# Patient Record
Sex: FEMALE | Race: BLACK OR AFRICAN AMERICAN
[De-identification: names, ages, dates, MRNs, and addresses within clinical notes are randomized per-mention and may not be internally consistent; named-entity substitution may affect disease eponyms.]

---

## 2017-06-23 ENCOUNTER — HOSPITAL ENCOUNTER (EMERGENCY)
Dept: HOSPITAL 72 - EMR | Age: 42
Discharge: HOME | End: 2017-06-23
Payer: COMMERCIAL

## 2017-06-23 VITALS — SYSTOLIC BLOOD PRESSURE: 147 MMHG | DIASTOLIC BLOOD PRESSURE: 87 MMHG

## 2017-06-23 VITALS — BODY MASS INDEX: 37.77 KG/M2 | WEIGHT: 235 LBS | HEIGHT: 66 IN

## 2017-06-23 DIAGNOSIS — M54.31: ICD-10-CM

## 2017-06-23 DIAGNOSIS — J04.0: Primary | ICD-10-CM

## 2017-06-23 PROCEDURE — 99284 EMERGENCY DEPT VISIT MOD MDM: CPT

## 2017-06-23 NOTE — EMERGENCY ROOM REPORT
History of Present Illness


General


Chief Complaint:  Pain


Source:  Patient





Present Illness


[facility] [place]


The patient is a 42-year-old female presenting for both coarse voice and right 

leg pain.  The patient states that the right leg pain began 3 months prior for 

no known reason.  She denies any injury.  Pain is described as an 8/10 dull/

electric pain that radiates from the R lower back down the R leg.  The pain is 

worse with prolonged sitting and bending over.  She denies any numbness or 

tingling.  She denies any leg swelling. 


The coarse voice began 2 months prior for no known reason.  She denies any sick 

contacts recent travel.  She denies coughing, sore throat, chest pain, 

shortness of breath, nausea, vomiting, fever, chills


Allergies:  


Coded Allergies:  


     No Known Allergies (Unverified , 17)





Patient History


Past Medical History:  see triage record


Pertinent Family History:  none


Last Menstrual Period:  17


Pregnant Now:  No


:  4


Para:  1


Reviewed Nursing Documentation:  PMH: Agreed, PSxH: Agreed





Nursing Documentation-PM


Past Medical History:  No Stated History





Review of Systems


All Other Systems:  negative except mentioned in HPI





Physical Exam





Vital Signs








  Date Time  Temp Pulse Resp B/P Pulse Ox O2 Delivery O2 Flow Rate FiO2


 


17 11:34 98.1 67 17 142/84 100 Room Air  








Sp02 EP Interpretation:  reviewed, normal


General Appearance:  no apparent distress, alert, GCS 15, non-toxic


Head:  normocephalic, atraumatic


Eyes:  bilateral eye PERRL, bilateral eye normal inspection


ENT:  hearing grossly normal, normal pharynx, no angioedema, uvula midline, 

moist mucus membranes, other - hoarse voice


Neck:  full range of motion, supple/symm/no masses


Respiratory:  chest non-tender, lungs clear, normal breath sounds, no wheezing, 

speaking full sentences


Cardiovascular #1:  regular rate, rhythm, no edema


Gastrointestinal:  normal bowel sounds, non tender, soft, non-distended, no 

guarding, no rebound


Genitourinary:  normal inspection, no CVA tenderness


Musculoskeletal:  normal inspection, gait/station normal, normal range of motion

, tender - TTP over the R posterior buttock and hamstring


Neurologic:  alert, oriented x3, responsive, motor strength/tone normal, 

sensory intact, normal gait, speech normal


Psychiatric:  judgement/insight normal, memory normal, mood/affect normal, no 

suicidal/homicidal ideation


Skin:  normal color, no rash, warm/dry, well hydrated


Lymphatic:  no adenopathy





Medical Decision Making


PA Attestation


Dr. Emery is my supervising physician. Patient management was discussed with 

my supervising physician


Diagnostic Impression:  


 Primary Impression:  


 Laryngitis, acute


 Additional Impression:  


 Sciatica


 Qualified Codes:  M54.31 - Sciatica, right side


ER Course


The patient is a 42-year-old female presenting for both coarse voice and right 

leg pain





Differential diagnosis include but not limited to pharyngitis, laryngitis, 

sinusitis, AOM, bronchitis, PNA, DVT, sciatica, muscle strain, disc herniation, 

among others





PE: vitals WNL. NAD


HEENT unremarkable. No lymphadenopathy. 


Lungs CTA bilat. 


Back: There is no midline tenderness.  There is right-sided lumbar paraspinal 

tenderness as well as right buttock tenderness.


Normal gait


There is no calf tenderness or edema.





The patient will be discharged home with a prescription for omeprazole, Motrin, 

and gabapentin.





She will followup with primary doctor and is given ER precautions





Last Vital Signs








  Date Time  Temp Pulse Resp B/P Pulse Ox O2 Delivery O2 Flow Rate FiO2


 


17 11:34 98.1 67 17 142/84 100 Room Air  








Status:  improved


Disposition:  HOME, SELF-CARE


Condition:  Improved


Scripts


Gabapentin* (GABAPENTIN*) 300 Mg Capsule


300 MG ORAL THREE TIMES A DAY, #30 CAP 0 Refills


   Prov: ANNA MCGILL P.A.         17 


Omeprazole (OMEPRAZOLE) 20 Mg Capsule.dr


20 MG ORAL DAILY, #30 CAP


   Prov: TERZIAN,ANNA P.A.         17 


Ibuprofen* (MOTRIN*) 600 Mg Tablet


600 MG ORAL Q8H Y for For Pain, #30 TAB 0 Refills


   Prov: TERZIANANNA P.A.         17


Patient Instructions:  Sciatica, Laryngitis





Additional Instructions:  


I discussed my findings with the patient. All questions and concerns have been 

answered. Treatment and medication compliance have been addressed. I advised 

the patient that they need to follow up with PMD in 3-5 days. Return to ED if 

symptoms worsen, new symptoms arise, or if needed for any reason. Patient 

verbalized understanding of discharge instructions.











ANNA MCGILL 2017 12:41

## 2017-09-11 ENCOUNTER — HOSPITAL ENCOUNTER (EMERGENCY)
Dept: HOSPITAL 72 - EMR | Age: 42
Discharge: HOME | End: 2017-09-11
Payer: COMMERCIAL

## 2017-09-11 VITALS — SYSTOLIC BLOOD PRESSURE: 132 MMHG | DIASTOLIC BLOOD PRESSURE: 89 MMHG

## 2017-09-11 VITALS — WEIGHT: 230 LBS | HEIGHT: 66 IN | BODY MASS INDEX: 36.96 KG/M2

## 2017-09-11 VITALS — SYSTOLIC BLOOD PRESSURE: 135 MMHG | DIASTOLIC BLOOD PRESSURE: 77 MMHG

## 2017-09-11 DIAGNOSIS — G89.29: Primary | ICD-10-CM

## 2017-09-11 DIAGNOSIS — J37.0: ICD-10-CM

## 2017-09-11 DIAGNOSIS — M54.9: ICD-10-CM

## 2017-09-11 PROCEDURE — 99283 EMERGENCY DEPT VISIT LOW MDM: CPT

## 2017-09-11 NOTE — EMERGENCY ROOM REPORT
History of Present Illness


General


Chief Complaint:  Pain


Source:  Patient





Present Illness


HPI


42-year-old female history of chronic back pain presenting with 4 months of 

change in voice and 4 months of back pain.


Pt states that she has had a very raspy voice for the last 4 months, states 

that in her job she is forced to talk a lot, and by the end of each work today 

she feels that her voice is almost gone.  Patient denies any throat pain, any 

pain with swallowing, any cough, any shortness of breath, or any fever or 

chills.


Patient also stating that she has had more than one year of right-sided chronic 

back pain.  Starts in the right lower back and radiates down thigh.  Worsened 

with any movement.  Patient states that she is to take gabapentin for the pain 

which relieved the pain.  Patient states that she has been taking Motrin 

Robaxin without any relief.  Patient denies any weakness or numbness of legs, 

no urinary retention, no fever chills


Allergies:  


Coded Allergies:  


     No Known Allergies (Unverified , 6/23/17)





Patient History


Past Medical History:  see triage record


Past Surgical History:  none


Pertinent Family History:  none


Last Menstrual Period:  9/1/17


Pregnant Now:  No


Reviewed Nursing Documentation:  PMH: Agreed, PSxH: Agreed





Nursing Documentation-PMH


Past Medical History:  No Stated History





Review of Systems


All Other Systems:  negative except mentioned in HPI





Physical Exam





Vital Signs








  Date Time  Temp Pulse Resp B/P (MAP) Pulse Ox O2 Delivery O2 Flow Rate FiO2


 


9/11/17 10:52 98.2 80 21 163/100 100 Room Air  








Sp02 EP Interpretation:  reviewed, normal


General Appearance:  normal inspection, well appearing, no apparent distress, 

alert, GCS 15, non-toxic


Head:  normocephalic, atraumatic


Eyes:  bilateral eye normal inspection, bilateral eye PERRL, bilateral eye EOMI


ENT:  normal ENT inspection, normal pharynx, moist mucus membranes, other - No 

tonsillar or uvula enlargement or exudates, uvula is midline, no erythema.  

Speaking with raspy voice


Neck:  normal inspection, full range of motion, supple


Respiratory:  normal inspection, lungs clear, normal breath sounds, no 

respiratory distress, no retraction, no wheezing, speaking full sentences, 

chest symmetrical


Cardiovascular #1:  normal inspection, regular rate, rhythm, no edema, normal 

capillary refill


Cardiovascular #2:  2+ radial (R), 2+ radial (L)


Gastrointestinal:  normal inspection, non tender, soft, non-distended, no 

guarding


Musculoskeletal:  normal range of motion, other - Paraspinal right lower lumbar 

tenderness, no midline tenderness.  Bilateral calves are nontender


Neurologic:  normal inspection, alert, oriented x3, responsive, motor strength/

tone normal, sensory intact, normal gait, speech normal


Psychiatric:  normal inspection, judgement/insight normal, memory normal


Skin:  normal inspection, normal color, no rash, warm/dry, well hydrated, 

normal turgor





Medical Decision Making


Diagnostic Impression:  


 Primary Impression:  


 Chronic back pain greater than 3 months duration


 Additional Impression:  


 Chronic laryngitis


ER Course


41 yo female with pmhx of chronic back pain p/w back pain and raspy voice


DDX: likely musculoskeletal back pain vs. muscular strain vs. sciatica


Lumbar fracture is unlikely given patients age, no midline tenderness, no 

history of trauma, and that patient is ambulatory. Therefore, at this time no 

imaging is indicated 


Serious diagnoses such as cord compression, epidural abscess is unlikely in 

this patient given the clinical scenario and abscess of neurological symptoms 

or findings. Patient appears nontoxic. 


Voice change likely secondary to laryngitis





Plan: motrin, robaxin offered however patient states that it does not work.  

Patient just wants a prescription for gabapentin





ER course:


Patient has remained nontoxic appearing and ambulatory in the ED. 








Disposition:


Patient will be discharged to home with prescription of gabapentin


Strict precautions discussed with patient on when to emergently return to the 

ED which includes severe/worsening back pain, leg weakness/numbness, urinary 

retention/incontinence, fever or chills, which may indicate severe illness.


Patient is to follow up with their PMD within 5 days. Patient agrees with plan. 


Patient also instructed to followup with an ENT specialist





Please note that this Emergency Department Report was dictated using [employer] technology software, occasionally this can lead to 

erroneous entry secondary to interpretation by the dictation equipment.





Last Vital Signs








  Date Time  Temp Pulse Resp B/P (MAP) Pulse Ox O2 Delivery O2 Flow Rate FiO2


 


9/11/17 11:03 98.4  18 135/77 100 Room Air  


 


9/11/17 10:52  80      








Disposition:  HOME, SELF-CARE


Condition:  Stable


Scripts


Gabapentin (Neurontin) 300 Mg Capsule


300 MG ORAL THREE TIMES A DAY for 7 Days, #21 CAP 0 Refills


   Prov: Gayatri Elizondo M.D.         9/11/17


Referrals:  


LIDA PORTILLO,REFERRING (PCP)


Patient Instructions:  Chronic Back Pain, Laryngitis, Easy-to-Read, Sciatica, 

Easy-to-Read











Gayatri Elizondo M.D. Sep 11, 2017 11:27

## 2018-01-23 ENCOUNTER — HOSPITAL ENCOUNTER (EMERGENCY)
Dept: HOSPITAL 72 - EMR | Age: 43
Discharge: HOME | End: 2018-01-23
Payer: COMMERCIAL

## 2018-01-23 VITALS — DIASTOLIC BLOOD PRESSURE: 52 MMHG | SYSTOLIC BLOOD PRESSURE: 143 MMHG

## 2018-01-23 VITALS — WEIGHT: 245 LBS | HEIGHT: 66 IN | BODY MASS INDEX: 39.37 KG/M2

## 2018-01-23 DIAGNOSIS — M54.31: Primary | ICD-10-CM

## 2018-01-23 PROCEDURE — 96372 THER/PROPH/DIAG INJ SC/IM: CPT

## 2018-01-23 PROCEDURE — 99284 EMERGENCY DEPT VISIT MOD MDM: CPT

## 2018-01-23 NOTE — EMERGENCY ROOM REPORT
History of Present Illness


General


Chief Complaint:  Pain


Source:  Patient





Present Illness


HPI


42-year-old female presents to the emergency department complaining of 10 out 

of 10 in severity right-sided low back pain that shoots down into the posterior 

right thigh x7 days.  Patient reports history of recurrent sciatica presenting 

with similar symptoms that she has experienced in the past.  Patient denies 

fevers, chills, recent spinal procedures, history of neoplastic disease.  

Patient denies trauma or fall.  Patient denies midline neck or back pain.  

Patient denies urinary retention.  She reports having multiple courses of 

muscle relaxers, pain medications, Neurontin with minimal to no relief.  

Patient states that her symptoms have increased in frequency of recurrence.  

She states she has not followed up with PMD and has not had further evaluation 

or imaging performed. Denies numbness tingling or loss of sensation or gross 

motor movements of the extremities, incontinence of bowel or bladder. Denies CP

, Palpitations, LOC, AMS, dizziness, Changes in Vision, Sensation, paresthesias

, or a sudden severe headache.


Allergies:  


Coded Allergies:  


     No Known Allergies (Unverified , 6/23/17)





Patient History


Past Medical History:  see triage record


Past Surgical History:  none


Pertinent Family History:  none


Last Menstrual Period:  01/09/17.


Pregnant Now:  No


Immunizations:  UTD


Reviewed Nursing Documentation:  PMH: Agreed, PSxH: Agreed





Review of Systems


All Other Systems:  negative except mentioned in HPI





Physical Exam





Vital Signs








  Date Time  Temp Pulse Resp B/P (MAP) Pulse Ox O2 Delivery O2 Flow Rate FiO2


 


1/23/18 11:43 97.7 84 19 143/52 100 Room Air  








Sp02 EP Interpretation:  reviewed, normal


General Appearance:  no apparent distress, alert, GCS 15, non-toxic


Head:  normocephalic, atraumatic


ENT:  hearing grossly normal, normal voice


Neck:  full range of motion


Respiratory:  lungs clear, normal breath sounds, speaking full sentences


Cardiovascular #1:  regular rate, rhythm, no edema, normal capillary refill


Cardiovascular #2:  2+ dorsalis pedis (R), 2+ dorsalis pedis (L)


Rectal:  deferred


Musculoskeletal:  back normal, gait/station normal, normal range of motion, 

tender - Right lateral paraspinal TTP, upper right gluteal TTP with 

exacerbation of radiating symptoms.  no LE weakness, NVI


Neurologic:  alert, oriented x3, responsive, motor strength/tone normal, 

sensory intact, normal gait, speech normal, grossly normal


Psychiatric:  judgement/insight normal


Skin:  normal color, no rash, warm/dry, well hydrated





Medical Decision Making


PA Attestation


Dr. Barry is my supervising Physician whom patient management has been 

discussed with.


Diagnostic Impression:  


 Primary Impression:  


 Sciatica


 Qualified Codes:  M54.31 - Sciatica, right side


ER Course


42-year-old female presents to the emergency department complaining of 10 out 

of 10 in severity right-sided low back pain that shoots down into the posterior 

right thigh x7 days.  Patient reports history of recurrent sciatica presenting 

with similar symptoms that she has experienced in the past.  Patient denies 

fevers, chills, recent spinal procedures, history of neoplastic disease.  

Patient denies trauma or fall.  Patient denies midline neck or back pain.  

Patient denies urinary retention.  She reports having multiple courses of 

muscle relaxers, pain medications, Neurontin with minimal to no relief.  

Patient states that her symptoms have increased in frequency of recurrence.  

She states she has not followed up with PMD and has not had further evaluation 

or imaging performed. Denies numbness tingling or loss of sensation or gross 

motor movements of the extremities, incontinence of bowel or bladder. Denies CP

, Palpitations, LOC, AMS, dizziness, Changes in Vision, Sensation, paresthesias

, or a sudden severe headache. 





Ddx considered but are not limited to Fracture, dislocation, contusion, 

epidural abscess, Sprain/Strain/Spasm





Vital signs: are WNL, pt. is afebrile


H&PE are most consistent with sciatica


Pt. unable to tolerate straight leg raise. 





ORDERS: X-ray not required at this time, no spinous process tenderness





ED INTERVENTIONS: 





 IM Toradol 20mg. 


-Soma PO





D/W pt. that MRI is necessary to develop appropriate treatment of her recurrent 

symptoms. This needs to be given as a referral from her PCP. 





DISCHARGE: At this time pt. is stable for d/c to home. Will provide printed 

patient care instructions, and any necessary prescriptions. Care plan and 

follow up instructions have been discussed with the patient prior to discharge.





Last Vital Signs








  Date Time  Temp Pulse Resp B/P (MAP) Pulse Ox O2 Delivery O2 Flow Rate FiO2


 


1/23/18 11:43 97.7 84 19 143/52 100 Room Air  








Disposition:  HOME, SELF-CARE


Condition:  Stable


Scripts


Prednisone* (PREDNISONE*) 20 Mg Tablet


40 MG ORAL DAILY for 5 Days, #10 TAB


   Prov: Caty Pardo.         1/23/18 


Lidocaine (Lidoderm) 1 Each Adh..patch


1 PATCH TOPIC DAILY, #25 PATCH 0 Refills


   Patch(es) may remain in place for up to 12 hours in any 24-hour


   period.


   Prov: Caty Pardo.         1/23/18 


Methocarbamol* (ROBAXIN*) 500 Mg Tablet


1000 MG PO TID for 7 Days, #28 TAB 0 Refills


   Prov: Caty Pardo.A.         1/23/18


Referrals:  


LIDA PORTILLO,REFERRING (PCP)


Departure Forms:  Return to Work      Return to Work Date:  Jan 27, 2018


   Work Restrictions:  No Heavy Lifting


   Other Restrictions:  Onset of symptoms since 1/22/18 pls. excuse. 


   Return to Full Activity:  Feb 3, 2018





Additional Instructions:  


Take medications as directed. 





**MRI imaging is required to properly diagnose as well as developed the 

appropriate treatment plan for your recurrent symptoms. **





 ** Follow up with a Primary Care Provider in 3-5 days, even if your symptoms 

have resolved. ** 





--Please review list of primary care clinics, if you do not already have a 

primary care provider





Return sooner to ED if new symptoms occur, or current symptoms become worse. 





Do not drink alcohol, drive, or operate heavy machinery while taking Robaxin as 

this may cause drowsiness. 








- Please note that this Emergency Department Report was dictated using [employer] technology software, occasionally this can lead to 

erroneous entry secondary to interpretation by the dictation equipment.











Caty Pardo Jan 23, 2018 12:24

## 2018-01-30 ENCOUNTER — HOSPITAL ENCOUNTER (EMERGENCY)
Dept: HOSPITAL 72 - EMR | Age: 43
Discharge: HOME | End: 2018-01-30
Payer: COMMERCIAL

## 2018-01-30 VITALS — DIASTOLIC BLOOD PRESSURE: 88 MMHG | SYSTOLIC BLOOD PRESSURE: 140 MMHG

## 2018-01-30 VITALS — SYSTOLIC BLOOD PRESSURE: 142 MMHG | DIASTOLIC BLOOD PRESSURE: 90 MMHG

## 2018-01-30 VITALS — HEIGHT: 66 IN | WEIGHT: 250 LBS | BODY MASS INDEX: 40.18 KG/M2

## 2018-01-30 VITALS — SYSTOLIC BLOOD PRESSURE: 125 MMHG | DIASTOLIC BLOOD PRESSURE: 75 MMHG

## 2018-01-30 VITALS — SYSTOLIC BLOOD PRESSURE: 136 MMHG | DIASTOLIC BLOOD PRESSURE: 84 MMHG

## 2018-01-30 DIAGNOSIS — M54.5: ICD-10-CM

## 2018-01-30 DIAGNOSIS — M54.31: Primary | ICD-10-CM

## 2018-01-30 LAB
ADD MANUAL DIFF: NO
ALBUMIN SERPL-MCNC: 3.2 G/DL (ref 3.4–5)
ALBUMIN/GLOB SERPL: 0.8 {RATIO} (ref 1–2.7)
ALP SERPL-CCNC: 76 U/L (ref 46–116)
ALT SERPL-CCNC: 38 U/L (ref 12–78)
ANION GAP SERPL CALC-SCNC: 7 MMOL/L (ref 5–15)
APPEARANCE UR: (no result)
APTT BLD: 28 SEC (ref 23–33)
APTT PPP: (no result) S
AST SERPL-CCNC: 31 U/L (ref 15–37)
BASOPHILS NFR BLD AUTO: 1.3 % (ref 0–2)
BILIRUB SERPL-MCNC: 0.3 MG/DL (ref 0.2–1)
BUN SERPL-MCNC: 10 MG/DL (ref 7–18)
CALCIUM SERPL-MCNC: 8.7 MG/DL (ref 8.5–10.1)
CHLORIDE SERPL-SCNC: 103 MMOL/L (ref 98–107)
CO2 SERPL-SCNC: 28 MMOL/L (ref 21–32)
CREAT SERPL-MCNC: 0.7 MG/DL (ref 0.55–1.3)
EOSINOPHIL NFR BLD AUTO: 2.9 % (ref 0–3)
ERYTHROCYTE [DISTWIDTH] IN BLOOD BY AUTOMATED COUNT: 12.9 % (ref 11.6–14.8)
GLOBULIN SER-MCNC: 3.9 G/DL
GLUCOSE UR STRIP-MCNC: NEGATIVE MG/DL
HCT VFR BLD CALC: 44.5 % (ref 37–47)
HGB BLD-MCNC: 14.2 G/DL (ref 12–16)
INR PPP: 1.1 (ref 0.9–1.1)
KETONES UR QL STRIP: NEGATIVE
LEUKOCYTE ESTERASE UR QL STRIP: (no result)
LYMPHOCYTES NFR BLD AUTO: 34.2 % (ref 20–45)
MCV RBC AUTO: 103 FL (ref 80–99)
MONOCYTES NFR BLD AUTO: 5.5 % (ref 1–10)
NEUTROPHILS NFR BLD AUTO: 56.2 % (ref 45–75)
NITRITE UR QL STRIP: NEGATIVE
PH UR STRIP: 7 [PH] (ref 4.5–8)
PLATELET # BLD: 350 K/UL (ref 150–450)
POTASSIUM SERPL-SCNC: 5 MMOL/L (ref 3.5–5.1)
PROT UR QL STRIP: NEGATIVE
RBC # BLD AUTO: 4.32 M/UL (ref 4.2–5.4)
SODIUM SERPL-SCNC: 138 MMOL/L (ref 136–145)
SP GR UR STRIP: 1.01 (ref 1–1.03)
UROBILINOGEN UR-MCNC: NORMAL MG/DL (ref 0–1)
WBC # BLD AUTO: 10.5 K/UL (ref 4.8–10.8)

## 2018-01-30 PROCEDURE — 81025 URINE PREGNANCY TEST: CPT

## 2018-01-30 PROCEDURE — 85730 THROMBOPLASTIN TIME PARTIAL: CPT

## 2018-01-30 PROCEDURE — 81003 URINALYSIS AUTO W/O SCOPE: CPT

## 2018-01-30 PROCEDURE — 85610 PROTHROMBIN TIME: CPT

## 2018-01-30 PROCEDURE — 85651 RBC SED RATE NONAUTOMATED: CPT

## 2018-01-30 PROCEDURE — 72131 CT LUMBAR SPINE W/O DYE: CPT

## 2018-01-30 PROCEDURE — 36415 COLL VENOUS BLD VENIPUNCTURE: CPT

## 2018-01-30 PROCEDURE — 96375 TX/PRO/DX INJ NEW DRUG ADDON: CPT

## 2018-01-30 PROCEDURE — 96374 THER/PROPH/DIAG INJ IV PUSH: CPT

## 2018-01-30 PROCEDURE — 85025 COMPLETE CBC W/AUTO DIFF WBC: CPT

## 2018-01-30 PROCEDURE — 99284 EMERGENCY DEPT VISIT MOD MDM: CPT

## 2018-01-30 PROCEDURE — 80053 COMPREHEN METABOLIC PANEL: CPT

## 2018-01-30 NOTE — DIAGNOSTIC IMAGING REPORT
Indications: Pain, chronic lumbar radiculopathy x6 months

 

Technique: Spiral acquisitions obtained through the lumbar spine. Multiplanar

reconstructions were generated. No IV contrast utilized. Total dose length product

1443.58 mGycm. CTDIvol(s) 43.03 mGy.  Dose reduction achieved using automated

exposure control

 

 

Comparison: none  

 

Findings: There is a few millimeters of anterior offset of L4 on L5. There is very

slight posterior offset of L5 on S1. The remainder of the bony alignment is normal.

The vertebral body heights are preserved. No acute fractures.

 

At L4-5, there is minimal degenerative disc narrowing with vacuum formation. No

significant spinal stenosis is demonstrated. There is bilateral facet arthrosis.

This, in combination with the alignment abnormality, results in mild right and

moderate left neural foraminal narrowing.

 

At L5-S1, no significant disc bulge or protrusion or spinal stenosis. There is mild

facet arthrosis bilaterally, which results in mild compromise of the bilateral neural

foramina.

 

At the remaining disc levels, no significant disc bulge or protrusion, spinal

stenosis, or neural foraminal stenosis.

 

The included extraspinal soft tissues are unremarkable.

 

Impression: Degenerative changes, as detailed above.

 

No acute bony trauma

 

The CT scanner at [facility] is accredited by the [employer] College of

Radiology and the scans are performed using protocols designed to limit radiation

exposure to as low as reasonably achievable to attain images of sufficient resolution

adequate for diagnostic evaluation.

## 2018-01-30 NOTE — EMERGENCY ROOM REPORT
History of Present Illness


General


Chief Complaint:  Pain


Source:  Patient, Significant Other





Present Illness


HPI


This is the 3rd presentation for back pain.  Diagnosed with sciatica.  Pain has 

been severe.  Unable to sleep.  Radiates down R leg.  Worse with movement of 

back and lifting leg.  Scheduled to have MRI in several days.  Never with this 

pain in the past.  No recent trauma.  Pain rated greater than 10/10.  Constant.

  Burning ache.  Muscle relaxants and NSAIDs not help.  Recent start on 

steroids which she feels has mad this worse.





No fevers, blood thinners, oncologic problems, saddle numbness, incontinence.  

There is some subjective weakness due to pain R leg.  No numbness.





No prior imaging. 





Has been unable to work due to pain.


Allergies:  


Coded Allergies:  


     No Known Allergies (Unverified , 6/23/17)





Patient History


Past Medical History:  see triage record


Social History:  Denies: smoking, alcohol use, drug use


Social History Narrative


with sig other


Reviewed Nursing Documentation:  PMH: Agreed, PSxH: Agreed





Review of Systems


All Other Systems:  negative except mentioned in HPI





Physical Exam





Vital Signs








  Date Time  Temp Pulse Resp B/P (MAP) Pulse Ox O2 Delivery O2 Flow Rate FiO2


 


1/30/18 10:13 97.2 78 20 140/89 99 Room Air  








Sp02 EP Interpretation:  reviewed, normal


General Appearance:  well appearing, GCS 15, mild distress


Head:  normocephalic


Eyes:  bilateral eye normal inspection, bilateral eye PERRL


ENT:  moist mucus membranes


Neck:  supple


Respiratory:  lungs clear, normal breath sounds


Cardiovascular #1:  regular rate, rhythm


Cardiovascular #2:  2+ radial (R)


Gastrointestinal:  normal inspection, normal bowel sounds, non tender, no mass, 

non-distended


Musculoskeletal:  other - lumbar pain, some muscle spasm.  SLR + R worsened 

pain.


Neurologic:  alert, oriented x3, motor strength/tone normal, DTRs symmetric, 

sensory intact, speech normal


Psychiatric:  other - upset


Reflexes:  2+ knee (R), 2+ knee (L), 1+ ankle (R), 1+ ankle (L)


Skin:  normal inspection, warm/dry





Medical Decision Making


Diagnostic Impression:  


 Primary Impression:  


 Sciatica


 Qualified Codes:  M54.31 - Sciatica, right side


ER Course


Patient presents again for lower back pain suggestion of sciatica.  Ddx: 

sciatica, arthropathy, disk disease, other arthritis, uncontrolled pain, UTI 

amongst others.  Needs eval with labs and CT of back.  Will aggressively 

control pain.  Consider admission for intractable back pain.   No red flag sy 

or signs, but severe back pain with inadequate analgesia merits work up.





Labs unremarkable.  Sed rate low.  CT as below.





Reluctant to take fentanyl, but convinced.





Pain reduced and controlled.





Patient stable for outpatient observation and treatment.





Laboratory Tests








Test


  1/30/18


11:40


 


White Blood Count


  10.5 K/UL


(4.8-10.8)


 


Red Blood Count


  4.32 M/UL


(4.20-5.40)


 


Hemoglobin


  14.2 G/DL


(12.0-16.0)


 


Hematocrit


  44.5 %


(37.0-47.0)


 


Mean Corpuscular Volume


  103 FL (80-99)


H


 


Mean Corpuscular Hemoglobin


  32.8 PG


(27.0-31.0)  H


 


Mean Corpuscular Hemoglobin


Concent 31.8 G/DL


(32.0-36.0)  L


 


Red Cell Distribution Width


  12.9 %


(11.6-14.8)


 


Platelet Count


  350 K/UL


(150-450)


 


Mean Platelet Volume


  4.8 FL


(6.5-10.1)  L


 


Neutrophils (%) (Auto)


  56.2 %


(45.0-75.0)


 


Lymphocytes (%) (Auto)


  34.2 %


(20.0-45.0)


 


Monocytes (%) (Auto)


  5.5 %


(1.0-10.0)


 


Eosinophils (%) (Auto)


  2.9 %


(0.0-3.0)


 


Basophils (%) (Auto)


  1.3 %


(0.0-2.0)


 


Erythrocyte Sedimentation Rate


  14 MM/HR


(0-20)


 


Prothrombin Time


  11.9 SEC


(9.30-11.50)  H


 


Prothrombin Time INR 1.1 (0.9-1.1)  


 


PTT


  28 SEC (23-33)


 


 


Urine Color Pale yellow  


 


Urine Appearance


  Slightly


cloudy


 


Urine pH 7 (4.5-8.0)  


 


Urine Specific Gravity


  1.010


(1.005-1.035)


 


Urine Protein


  Negative


(NEGATIVE)


 


Urine Glucose (UA)


  Negative


(NEGATIVE)


 


Urine Ketones


  Negative


(NEGATIVE)


 


Urine Occult Blood


  Negative


(NEGATIVE)


 


Urine Nitrite


  Negative


(NEGATIVE)


 


Urine Bilirubin


  Negative


(NEGATIVE)


 


Urine Urobilinogen


  Normal MG/DL


(0.0-1.0)


 


Urine Leukocyte Esterase


  1+ (NEGATIVE)


H


 


Urine RBC


  0-2 /HPF (0 -


2)


 


Urine WBC


  2-4 /HPF (0 -


2)


 


Urine Squamous Epithelial


Cells Moderate /LPF


(NONE/OCC)  H


 


Urine Bacteria


  Few /HPF


(NONE)


 


Urine HCG, Qualitative Negative  


 


Sodium Level


  138 MMOL/L


(136-145)


 


Potassium Level


  5.0 MMOL/L


(3.5-5.1)


 


Chloride Level


  103 MMOL/L


()


 


Carbon Dioxide Level


  28 MMOL/L


(21-32)


 


Anion Gap


  7 mmol/L


(5-15)


 


Blood Urea Nitrogen


  10 mg/dL


(7-18)


 


Creatinine


  0.7 MG/DL


(0.55-1.30)


 


Estimate Glomerular


Filtration Rate > 60 mL/min


(>60)


 


Glucose Level


  78 MG/DL


()


 


Calcium Level


  8.7 MG/DL


(8.5-10.1)


 


Total Bilirubin


  0.3 MG/DL


(0.2-1.0)


 


Aspartate Amino Transferase


(AST) 31 U/L (15-37)


 


 


Alanine Aminotransferase (ALT)


  38 U/L (12-78)


 


 


Alkaline Phosphatase


  76 U/L


()


 


Total Protein


  7.1 G/DL


(6.4-8.2)


 


Albumin


  3.2 G/DL


(3.4-5.0)  L


 


Globulin 3.9 g/dL  


 


Albumin/Globulin Ratio


  0.8 (1.0-2.7)


L








CT/MRI/US Diagnostic Results


CT/MRI/US Diagnostic Results :  


   Imaging Test Ordered:  L spine


   Impression


Findings: There is a few millimeters of anterior offset of L4 on L5. There is 

very slight posterior offset of L5 on S1. The remainder of the bony alignment 

is normal. The vertebral body heights are preserved. No acute fractures.


At L4-5, there is minimal degenerative disc narrowing with vacuum formation. No 

significant spinal stenosis is demonstrated. There is bilateral facet 

arthrosis.  This, in combination with the alignment abnormality, results in 

mild right and moderate left neural foraminal narrowing.


At L5-S1, no significant disc bulge or protrusion or spinal stenosis. There is 

mild facet arthrosis bilaterally, which results in mild compromise of the 

bilateral neural


foramina.


At the remaining disc levels, no significant disc bulge or protrusion, spinal 

stenosis, or neural foraminal stenosis.


The included extraspinal soft tissues are unremarkable.


Impression: Degenerative changes, as detailed above.


No acute bony trauma





Last Vital Signs








  Date Time  Temp Pulse Resp B/P (MAP) Pulse Ox O2 Delivery O2 Flow Rate FiO2


 


1/30/18 15:50 98.4 90 16 125/75 99 Room Air  








Status:  improved


Disposition:  HOME, SELF-CARE


Condition:  Improved


Scripts


Hydrocodone Bit/Acetaminophen 5-325* (NORCO 5-325*) 1 Each Tablet


1 TAB ORAL Q6H Y for For Pain, #16 TAB 0 Refills


   Prov: Edwin Brunson M.D.         1/30/18 


Ibuprofen* (MOTRIN*) 600 Mg Tablet


600 MG ORAL Q6H Y for For Pain, #20 TAB


   Prov: Edwin Brunson M.D.         1/30/18


Referrals:  


LIDA PORTILLO,REFERRING (PCP)











Edwin Brunson M.D. Jan 30, 2018 15:38

## 2018-02-28 ENCOUNTER — HOSPITAL ENCOUNTER (EMERGENCY)
Dept: HOSPITAL 72 - EMR | Age: 43
LOS: 1 days | Discharge: HOME | End: 2018-03-01
Payer: MEDICAID

## 2018-02-28 VITALS — SYSTOLIC BLOOD PRESSURE: 146 MMHG | DIASTOLIC BLOOD PRESSURE: 88 MMHG

## 2018-02-28 VITALS — BODY MASS INDEX: 39.21 KG/M2 | HEIGHT: 66 IN | WEIGHT: 244 LBS

## 2018-02-28 DIAGNOSIS — M53.87: Primary | ICD-10-CM

## 2018-02-28 DIAGNOSIS — M54.16: ICD-10-CM

## 2018-02-28 PROCEDURE — 99284 EMERGENCY DEPT VISIT MOD MDM: CPT

## 2018-02-28 PROCEDURE — 81025 URINE PREGNANCY TEST: CPT

## 2018-02-28 PROCEDURE — 96372 THER/PROPH/DIAG INJ SC/IM: CPT

## 2018-02-28 PROCEDURE — 81003 URINALYSIS AUTO W/O SCOPE: CPT

## 2018-03-01 VITALS — SYSTOLIC BLOOD PRESSURE: 140 MMHG | DIASTOLIC BLOOD PRESSURE: 85 MMHG

## 2018-03-01 LAB
APPEARANCE UR: CLEAR
APTT PPP: (no result) S
GLUCOSE UR STRIP-MCNC: NEGATIVE MG/DL
KETONES UR QL STRIP: NEGATIVE
LEUKOCYTE ESTERASE UR QL STRIP: (no result)
NITRITE UR QL STRIP: NEGATIVE
PH UR STRIP: 5 [PH] (ref 4.5–8)
PROT UR QL STRIP: NEGATIVE
SP GR UR STRIP: 1.02 (ref 1–1.03)
UROBILINOGEN UR-MCNC: NORMAL MG/DL (ref 0–1)

## 2018-08-02 ENCOUNTER — HOSPITAL ENCOUNTER (EMERGENCY)
Dept: HOSPITAL 72 - EMR | Age: 43
Discharge: HOME | End: 2018-08-02
Payer: MEDICAID

## 2018-08-02 VITALS — BODY MASS INDEX: 39.05 KG/M2 | WEIGHT: 243 LBS | HEIGHT: 66 IN

## 2018-08-02 VITALS — DIASTOLIC BLOOD PRESSURE: 81 MMHG | SYSTOLIC BLOOD PRESSURE: 124 MMHG

## 2018-08-02 VITALS — SYSTOLIC BLOOD PRESSURE: 124 MMHG | DIASTOLIC BLOOD PRESSURE: 81 MMHG

## 2018-08-02 DIAGNOSIS — M54.30: Primary | ICD-10-CM

## 2018-08-02 PROCEDURE — 99283 EMERGENCY DEPT VISIT LOW MDM: CPT

## 2018-08-02 NOTE — EMERGENCY ROOM REPORT
History of Present Illness


General


Chief Complaint:  Pain


Source:  Patient





Present Illness


HPI


43-year-old female presents to the emergency department complaining of 10 out 

of 10 in severity pain radiating down the right leg originating from the right 

side of the low back since Saturday.  Patient denies appreciable trauma or 

fall.  She reports history of sciatica.  Patient states that in the past she 

was typically treated with muscle relaxers and anti-inflammatories.  She states 

that she has been receiving spinal injections by pain management which have 

been working well.  Patient states that she just changed insurances and her 

next treatment which is due at approximately this time is being postponed. 

Denies numbness tingling or loss of sensation or gross motor movements of the 

extremities, incontinence of bowel or bladder. Denies urinary symptoms such as 

frequency, urgency, dysuria or hematuria. Denies CP, Palpitations, LOC, AMS, 

dizziness, Changes in Vision, weakness or a sudden severe headache. reports 

last spinal injection was over 3 months ago. pt. denies hx of cancer.


Allergies:  


Coded Allergies:  


     No Known Allergies (Unverified , 17)





Patient History


Past Medical History:  see triage record


Past Surgical History:  none


Pertinent Family History:  none


Last Menstrual Period:  18


Pregnant Now:  No


:  4


Para:  1





Nursing Documentation-[facility]


Past Medical History:  No History, Except For





Review of Systems


All Other Systems:  negative except mentioned in HPI





Physical Exam





Vital Signs








  Date Time  Temp Pulse Resp B/P (MAP) Pulse Ox O2 Delivery O2 Flow Rate FiO2


 


18 16:24 98.2 84 20 124/81 100 Room Air  





 98.2       








Sp02 EP Interpretation:  reviewed, normal


General Appearance:  no apparent distress, alert, GCS 15, non-toxic


Head:  normocephalic, atraumatic


Eyes:  bilateral eye normal inspection, bilateral eye PERRL


ENT:  hearing grossly normal, normal voice


Neck:  full range of motion


Respiratory:  lungs clear, normal breath sounds, speaking full sentences


Cardiovascular #1:  regular rate, rhythm


Gastrointestinal:  non tender, soft


Rectal:  deferred


Genitourinary:  normal inspection, no CVA tenderness


Musculoskeletal:  back normal, gait/station normal - compensatory, normal range 

of motion, tender - TTP to the right paraspinal musculature of the lumbar reqion

, no hip ttp or femur ttp. no midline spinous process ttp.


Neurologic:  alert, oriented x3, responsive, motor strength/tone normal, 

sensory intact, normal gait - compensatory gait, ambulatory , speech normal, 

grossly normal


Psychiatric:  judgement/insight normal


Skin:  normal color, no rash, warm/dry, well hydrated


Lymphatic:  no adenopathy





Medical Decision Making


PA Attestation


Dr. Shook  is my supervising Physician whom patient management has been 

discussed with.


Diagnostic Impression:  


 Primary Impression:  


 Acute exacerbation of chronic low back pain


 Additional Impressions:  


 Sciatica


 Qualified Codes:  M54.31 - Sciatica, right side


 Lumbar disc disease with radiculopathy


ER Course


43-year-old female presents to the emergency department complaining of 10 out 

of 10 in severity pain radiating down the right leg originating from the right 

side of the low back since Saturday.  Patient denies appreciable trauma or 

fall.  She reports history of sciatica.  Patient states that in the past she 

was typically treated with muscle relaxers and anti-inflammatories.  She states 

that she has been receiving spinal injections by pain management which have 

been working well.  Patient states that she just changed insurances and her 

next treatment which is due at approximately this time is being postponed. 

Denies numbness tingling or loss of sensation or gross motor movements of the 

extremities, incontinence of bowel or bladder. Denies urinary symptoms such as 

frequency, urgency, dysuria or hematuria. Denies CP, Palpitations, LOC, AMS, 

dizziness, Changes in Vision, weakness or a sudden severe headache. reports 

last spinal injection was over 3 months ago. pt. denies hx of cancer. 





Ddx considered but are not limited to Fracture, dislocation, contusion, 

epidural abscess, Sprain/Strain/Spasm





Vital signs: are WNL, pt. is afebrile


H&PE are most consistent with sciatica, no evidence of infection at this time. 


 Pt. unable to tolerate straight leg raise. 


-Pt. is ambulatory





ORDERS: X-ray not required at this time, no spinous process tenderness





ED INTERVENTIONS: 





Pt. declines Toradol, reports only Dilaudid worked for her in the past. 








d/w pt. conservative treatment, and to follow up with a primary care provider. d

/w pt. to return to the ED with worsening or new symptoms.


DISCHARGE: At this time pt. is stable for d/c to home. Will provide printed 

patient care instructions, and any necessary prescriptions. Care plan and 

follow up instructions have been discussed with the patient prior to discharge.





Last Vital Signs








  Date Time  Temp Pulse Resp B/P (MAP) Pulse Ox O2 Delivery O2 Flow Rate FiO2


 


18 16:46 98.2 87 20 124/81 100 Room Air  





 98.2       








Disposition:  HOME, SELF-CARE


Condition:  Stable


Scripts


Carisoprodol* (SOMA*) 350 Mg Tablet


350 MG PO Q8HR, #15 TAB


   Prov: Caty Pardo         18


Patient Instructions:  Sciatica, Easy-to-Read





Additional Instructions:  


Take medications as directed. 





 ** Follow up with your Pain Management Specialist  in 3-5 days **  for 

additional prescriptions and to finish your injection treatments. 











Return sooner to ED if new symptoms occur, or current symptoms become worse. 


Do not drink alcohol, drive, or operate heavy machinery while taking muscle 

relaxers as this may cause drowsiness. 











- Please note that this Emergency Department Report was dictated using [employer]wl technology software, occasionally this can lead to 

erroneous entry secondary to interpretation by the dictation equipment.











Caty Pardo Aug 2, 2018 17:00

## 2018-08-04 ENCOUNTER — HOSPITAL ENCOUNTER (EMERGENCY)
Dept: HOSPITAL 72 - EMR | Age: 43
Discharge: HOME | End: 2018-08-04
Payer: COMMERCIAL

## 2018-08-04 VITALS — SYSTOLIC BLOOD PRESSURE: 145 MMHG | DIASTOLIC BLOOD PRESSURE: 91 MMHG

## 2018-08-04 VITALS — HEIGHT: 66 IN | BODY MASS INDEX: 38.57 KG/M2 | WEIGHT: 240 LBS

## 2018-08-04 VITALS — DIASTOLIC BLOOD PRESSURE: 91 MMHG | SYSTOLIC BLOOD PRESSURE: 145 MMHG

## 2018-08-04 DIAGNOSIS — M51.16: Primary | ICD-10-CM

## 2018-08-04 PROCEDURE — 99283 EMERGENCY DEPT VISIT LOW MDM: CPT

## 2018-08-04 PROCEDURE — 96372 THER/PROPH/DIAG INJ SC/IM: CPT

## 2024-12-24 NOTE — EMERGENCY ROOM REPORT
History of Present Illness


General


Chief Complaint:  Pain


Source:  Patient





Present Illness


HPI


Patient has a history of right-sided sciatica.  She states that she was 

recently admitted to the hospital for severe and uncontrolled sciatica pain.  

She states that she is under the care of a pain management specialist.  She 

states she is unsure whether her insurance company which she had to change will 

be able to cover this pain management specialist.  She states she did receive 

an epidural and his had complete relief until about a week ago.  She states 

that she feels that her sciatica is ramping up.  She states this occurred after 

lifting a heavy object that she shouldn't have lifted.  She states that she has 

numbness in her lower leg and that she has pain radiating down her right thigh 

to her knee.  She has had extensive imaging to include MRI of her lumbar spine.

  She does have some disc disease and disc bulging.  She denies recent injury 

or new symptoms.  She denies loss of bowel or bladder control.  She has no 

other complaints.


Allergies:  


Coded Allergies:  


     No Known Allergies (Unverified , 6/23/17)





Patient History


Past Medical History:  see triage record, other - sciatica


Social History:  Denies: smoking, alcohol use, drug use


Last Menstrual Period:  8/2/18


Pregnant Now:  No


Reviewed Nursing Documentation:  PMH: Agreed; PSxH: Agreed





Nursing Documentation-PMH


Past Medical History:  No History, Except For





Review of Systems


All Other Systems:  negative except mentioned in HPI





Physical Exam





Vital Signs








  Date Time  Temp Pulse Resp B/P (MAP) Pulse Ox O2 Delivery O2 Flow Rate FiO2


 


8/4/18 08:49 98.5 92 20 145/91 99 Room Air  





 98.4       








Sp02 EP Interpretation:  reviewed, normal


General Appearance:  no apparent distress, alert, GCS 15, non-toxic


Head:  normocephalic, atraumatic


Eyes:  bilateral eye normal inspection, bilateral eye PERRL


ENT:  hearing grossly normal, normal pharynx, no angioedema, normal voice


Neck:  normal inspection


Respiratory:  no respiratory distress, no retraction, no accessory muscle use, 

speaking full sentences


Rectal:  deferred


Musculoskeletal:  gait/station normal, normal range of motion, other - TTP in 

the R. paraspinal m.


Neurologic:  alert, oriented x3, responsive, motor strength/tone normal, 

sensory intact, speech normal


Psychiatric:  judgement/insight normal, memory normal, mood/affect normal, no 

suicidal/homicidal ideation


Skin:  normal color, no rash, warm/dry, well hydrated





Medical Decision Making


Diagnostic Impression:  


 Primary Impression:  


 Lumbar disc disease with radiculopathy


 Additional Impression:  


 Sciatica


ER Course


This patient has a clinical presentation consistent with mechanical back pain/

sciatica.  There are no red flags on physical exam.  The patient denies any 

concerning features such as trauma, fevers, night sweats, history of malignancy

, pain worse at night, IV drug abuse, urinary/fecal incontinence or retention, 

focal weakness or change in sensation, or refractory pain.  Given these 

pertinent negatives in the history and physical exam an emergent cause of the 

back pain such as epidural abscess, metastasis to bone, cauda equina syndrome, 

and fracture is less likely.  I also doubt emergent cardiovascular cause of 

back pain such as aortic dissection a ruptured abdominal aortic aneurysm given 

patient with equal pulses in all 4 extremities with no diastolic murmur or 

pulsatile abdominal mass.  The patient was counseled that, though unlikely, the 

possibility of an emergent cause of back pain may still be present and that the 

patient should return immediately if symptoms persist or worsen.  The symptoms 

are reproducible with movement.  Patient had a benign evaluation and neurologic 

examination.  No emergency etiology was identified.





Last Vital Signs








  Date Time  Temp Pulse Resp B/P (MAP) Pulse Ox O2 Delivery O2 Flow Rate FiO2


 


8/4/18 08:49 98.5 92 20 145/91 99 Room Air  





 98.4       








Disposition:  HOME, SELF-CARE


Condition:  Stable


Referrals:  


PREFERRED [facility],REFERRING (PCP)











Rocio Emery DO Aug 4, 2018 09:51 Lov 07/03/24  Rudi 08/01/24